# Patient Record
Sex: FEMALE | Race: WHITE | URBAN - METROPOLITAN AREA
[De-identification: names, ages, dates, MRNs, and addresses within clinical notes are randomized per-mention and may not be internally consistent; named-entity substitution may affect disease eponyms.]

---

## 2021-01-01 ENCOUNTER — POST-OPERATIVE VISIT (OUTPATIENT)
Dept: URBAN - METROPOLITAN AREA CLINIC 71 | Facility: CLINIC | Age: 81
End: 2021-01-01
Payer: MEDICARE

## 2021-01-01 ENCOUNTER — POST-OPERATIVE VISIT (OUTPATIENT)
Dept: URBAN - METROPOLITAN AREA CLINIC 72 | Facility: CLINIC | Age: 81
End: 2021-01-01
Payer: MEDICARE

## 2021-01-01 ENCOUNTER — SURGERY (OUTPATIENT)
Dept: URBAN - METROPOLITAN AREA SURGERY 44 | Facility: SURGERY | Age: 81
End: 2021-01-01
Payer: MEDICARE

## 2021-01-01 ENCOUNTER — SURGERY (OUTPATIENT)
Dept: URBAN - METROPOLITAN AREA SURGERY 45 | Facility: SURGERY | Age: 81
End: 2021-01-01
Payer: MEDICARE

## 2021-01-01 DIAGNOSIS — Z48.810 ENCOUNTER FOR SURGICAL AFTERCARE FOLLOWING SURGERY ON A SENSE ORGAN: Primary | ICD-10-CM

## 2021-01-01 DIAGNOSIS — H25.813 COMBINED FORMS OF AGE-RELATED CATARACT, BILATERAL: Primary | ICD-10-CM

## 2021-01-01 DIAGNOSIS — H35.362 DRUSEN (DEGENERATIVE) OF MACULA, LEFT EYE: ICD-10-CM

## 2021-01-01 DIAGNOSIS — Z96.1 PRESENCE OF INTRAOCULAR LENS: ICD-10-CM

## 2021-01-01 DIAGNOSIS — H04.123 DRY EYE SYNDROME OF BILATERAL LACRIMAL GLANDS: ICD-10-CM

## 2021-01-01 DIAGNOSIS — H43.813 VITREOUS DEGENERATION, BILATERAL: Primary | ICD-10-CM

## 2021-01-01 DIAGNOSIS — H25.811 COMBINED FORMS OF AGE-RELATED CATARACT, RIGHT EYE: ICD-10-CM

## 2021-01-01 DIAGNOSIS — H47.322 DRUSEN OF OPTIC DISC, LEFT EYE: ICD-10-CM

## 2021-01-01 PROCEDURE — G8918 PT W/O PREOP ORDER IV AB PRO: HCPCS | Performed by: CLINIC/CENTER

## 2021-01-01 PROCEDURE — 92134 CPTRZ OPH DX IMG PST SGM RTA: CPT | Performed by: OPHTHALMOLOGY

## 2021-01-01 PROCEDURE — G8907 PT DOC NO EVENTS ON DISCHARG: HCPCS | Performed by: CLINIC/CENTER

## 2021-01-01 PROCEDURE — 92136 OPHTHALMIC BIOMETRY: CPT | Performed by: OPHTHALMOLOGY

## 2021-01-01 PROCEDURE — 99024 POSTOP FOLLOW-UP VISIT: CPT | Performed by: OPHTHALMOLOGY

## 2021-01-01 PROCEDURE — 92014 COMPRE OPH EXAM EST PT 1/>: CPT | Performed by: OPHTHALMOLOGY

## 2021-01-01 PROCEDURE — 92004 COMPRE OPH EXAM NEW PT 1/>: CPT | Performed by: OPHTHALMOLOGY

## 2021-01-01 PROCEDURE — 99024 POSTOP FOLLOW-UP VISIT: CPT | Performed by: OPTOMETRIST

## 2021-01-01 ASSESSMENT — INTRAOCULAR PRESSURE
OS: 19
OD: 19
OD: 14
OD: 16
OD: 26
OS: 21
OS: 16
OD: 7
OS: 14
OS: 14

## 2021-04-14 NOTE — IMPRESSION/PLAN
Impression: Combined forms of age-related cataract, bilateral: H25.813. Plan: Discussed diagnosis in detail with patient. Discussed risks and benefits and patient understands. Discussed treatment options with patient. Surgical treatment is necessary to improve vision. Surgical risks and benefits were discussed, explained and understood by patient. Patient elects to have surgery. Pre op instructions given and understood. Lid scrubs and hygiene were explained. Patient instructed to use artificial tears as needed. Post op instructions given and understood. Continue using current medication(s). PROCEED WITH CARE PLUS CATARACT SURGERY WITH ORA, OS FIRST, DISTANCE, THEN OD, DISTANCE, DEXYCU, CS DONE.

## 2021-04-27 NOTE — IMPRESSION/PLAN
Impression:  Encounter for surgical aftercare following surgery on a sense organ  Z48.810. Excellent post op course   Post operative instructions reviewed - Pt advised she should see improvement over the next 3-4 days Plan:  Will recheck next week for clearance for 2nd eye

## 2021-05-04 NOTE — IMPRESSION/PLAN
Impression:  Encounter for surgical aftercare following surgery on a sense organ  Z48.810. Excellent post op course   Post operative instructions reviewed - Pt improving, but still experiencing a bit of swelling. Pt has continued microcystic edema from central to temporal corneal only. Has improved since last visit. Will have pt see Dr Jaquan Mora on Friday. If vision and edema are improved, will proceed with surgery this coming Monday. Otherwise will postpone for 1 week for more time to heal Plan: Discussed.  Will have rechecked Friday

## 2021-05-07 NOTE — IMPRESSION/PLAN
Impression: S/P Cataract Extraction by phacoemulsification with IOL placement; ORA OS - 11 Days. Encounter for surgical aftercare following surgery on a sense organ  Z48.810. Excellent post op course   Post operative instructions reviewed - Condition is improving - Plan: Vision improved, IOP is good. Ok to proceed with second eye  Discussed R/B/A to surgery, pt voices understanding and wishes to proceed. --Advised patient to use artificial tears for comfort.

## 2021-05-11 NOTE — IMPRESSION/PLAN
Impression:  Encounter for surgical aftercare following surgery on a sense organ  Z48.810. Excellent post op course   Post operative instructions reviewed - Pt informed vision should improve a bit over the next couple days. Plan: Continue with 4 week PO Ref.  Contact office if any issues

## 2021-09-30 NOTE — IMPRESSION/PLAN
Impression: Dry eye syndrome of bilateral lacrimal glands: H04.123.  Plan: Recommend artificial tears as needed for dryness

## 2021-09-30 NOTE — IMPRESSION/PLAN
Impression: Vitreous degeneration, bilateral: H43.813. No holes or tears on physical exam today. Residual Dexycu OS. Plan: Discussed. Informed patient the dexycu from surgery should continue to resolve on it's own. Will have patient return to see if dexycu has resolved. If not improved, will have evaluated with retina for vitrectomy.